# Patient Record
Sex: MALE | Race: WHITE
[De-identification: names, ages, dates, MRNs, and addresses within clinical notes are randomized per-mention and may not be internally consistent; named-entity substitution may affect disease eponyms.]

---

## 2018-02-01 ENCOUNTER — HOSPITAL ENCOUNTER (INPATIENT)
Dept: HOSPITAL 36 - ER | Age: 78
LOS: 6 days | Discharge: HOME | DRG: 885 | End: 2018-02-07
Attending: PSYCHIATRY & NEUROLOGY | Admitting: PSYCHIATRY & NEUROLOGY
Payer: COMMERCIAL

## 2018-02-01 VITALS — DIASTOLIC BLOOD PRESSURE: 75 MMHG | SYSTOLIC BLOOD PRESSURE: 135 MMHG

## 2018-02-01 DIAGNOSIS — F41.9: ICD-10-CM

## 2018-02-01 DIAGNOSIS — I25.2: ICD-10-CM

## 2018-02-01 DIAGNOSIS — F29: Primary | ICD-10-CM

## 2018-02-01 DIAGNOSIS — N40.0: ICD-10-CM

## 2018-02-01 DIAGNOSIS — F32.9: ICD-10-CM

## 2018-02-01 DIAGNOSIS — Z82.49: ICD-10-CM

## 2018-02-01 DIAGNOSIS — Z80.1: ICD-10-CM

## 2018-02-01 DIAGNOSIS — Z88.0: ICD-10-CM

## 2018-02-01 DIAGNOSIS — R45.851: ICD-10-CM

## 2018-02-01 DIAGNOSIS — I25.10: ICD-10-CM

## 2018-02-01 LAB
ALBUMIN SERPL-MCNC: 3.8 GM/DL (ref 4.2–5.5)
ALBUMIN/GLOB SERPL: 1.8 {RATIO} (ref 1–1.8)
ALP SERPL-CCNC: 78 U/L (ref 34–104)
ALT SERPL-CCNC: 25 U/L (ref 7–52)
ANION GAP SERPL CALC-SCNC: 10.3 MMOL/L (ref 7–16)
APPEARANCE UR: CLEAR
AST SERPL-CCNC: 19 U/L (ref 13–39)
BACTERIA #/AREA URNS HPF: (no result) /HPF
BASOPHILS # BLD AUTO: 0 TH/CUMM (ref 0–0.2)
BASOPHILS NFR BLD AUTO: 0.1 % (ref 0–2)
BILIRUB SERPL-MCNC: 0.7 MG/DL (ref 0.3–1)
BILIRUB UR-MCNC: NEGATIVE MG/DL
BUN SERPL-MCNC: 16 MG/DL (ref 7–25)
CALCIUM SERPL-MCNC: 8.9 MG/DL (ref 8.6–10.3)
CHLORIDE SERPL-SCNC: 103 MEQ/L (ref 98–107)
CHOLEST SERPL-MCNC: 109 MG/DL (ref ?–200)
CO2 SERPL-SCNC: 28.6 MEQ/L (ref 21–31)
COLOR UR: YELLOW
CREAT SERPL-MCNC: 1.3 MG/DL (ref 0.7–1.3)
EOSINOPHIL # BLD AUTO: 0.2 TH/CMM (ref 0.1–0.4)
EOSINOPHIL NFR BLD AUTO: 3.3 % (ref 0–5)
EPI CELLS URNS QL MICRO: (no result) /LPF
ERYTHROCYTE [DISTWIDTH] IN BLOOD BY AUTOMATED COUNT: 13.3 % (ref 11.5–20)
GLOBULIN SER-MCNC: 2.1 GM/DL
GLUCOSE SERPL-MCNC: 125 MG/DL (ref 70–105)
GLUCOSE UR STRIP-MCNC: NEGATIVE MG/DL
HCT VFR BLD CALC: 46.8 % (ref 41–60)
HDLC SERPL-MCNC: 45 MG/DL (ref 23–92)
HGB BLD-MCNC: 15.9 GM/DL (ref 12–16)
KETONES UR STRIP-MCNC: NEGATIVE MG/DL
LEUKOCYTE ESTERASE UR-ACNC: NEGATIVE
LYMPHOCYTE AB SER FC-ACNC: 1.3 TH/CMM (ref 1.5–3)
LYMPHOCYTES NFR BLD AUTO: 21.6 % (ref 20–50)
MCH RBC QN AUTO: 32.1 PG (ref 27–31)
MCHC RBC AUTO-ENTMCNC: 33.9 PG (ref 28–36)
MCV RBC AUTO: 94.7 FL (ref 80–99)
MICRO URNS: YES
MONOCYTES # BLD AUTO: 0.7 TH/CMM (ref 0.3–1)
MONOCYTES NFR BLD AUTO: 12.4 % (ref 2–10)
NEUTROPHILS # BLD: 3.6 TH/CMM (ref 1.8–8)
NEUTROPHILS NFR BLD AUTO: 62.6 % (ref 40–80)
NITRITE UR QL STRIP: NEGATIVE
PH UR STRIP: 5.5 [PH] (ref 4.6–8)
PLATELET # BLD: 194 TH/CMM (ref 150–400)
PMV BLD AUTO: 8.2 FL
POTASSIUM SERPL-SCNC: 3.9 MEQ/L (ref 3.5–5.1)
PROT UR STRIP-MCNC: NEGATIVE MG/DL
RBC # BLD AUTO: 4.94 MIL/CMM (ref 3.8–5.8)
RBC # UR STRIP: NEGATIVE /UL
RBC #/AREA URNS HPF: (no result) /HPF (ref 0–5)
SODIUM SERPL-SCNC: 138 MEQ/L (ref 136–145)
SP GR UR STRIP: >= 1.03 (ref 1–1.03)
TRIGL SERPL-MCNC: 189 MG/DL (ref ?–150)
URINALYSIS COMPLETE PNL UR: (no result)
UROBILINOGEN UR STRIP-ACNC: 0.2 E.U./DL (ref 0.2–1)
WBC # BLD AUTO: 5.8 TH/CMM (ref 4.8–10.8)
WBC #/AREA URNS HPF: (no result) /HPF (ref 0–5)

## 2018-02-01 PROCEDURE — G0410 GRP PSYCH PARTIAL HOSP 45-50: HCPCS

## 2018-02-01 PROCEDURE — Z7610: HCPCS

## 2018-02-01 NOTE — ED PHYSICIAN CHART
ED Chief Complaint/HPI





- Patient Information


Date Seen:: 18


Time Seen:: 14:00


Chief Complaint:: suicide ideation


History of Present Illness:: 





Patient's been having suicidal ideation for more than one month.  He states he 

would like to go to sleep and not wake up.  He also thought about jumping off a 

second story of his house.





ED Review of Systems





- Review of Systems


General/Constitutional: No fever, No chills


Skin: No skin lesions


Head: No headache


Eyes: No loss of vision


ENT: No earache


Neck: No neck pain


Cardio Vascular: No chest pain


Pulmonary: No SOB


GI: No nausea, No vomiting, No diarrhea


G/U: No dysuria


Musculoskeletal: No bone or joint pain


Endocrine: No polyuria, No polydipsia


Psychiatric: Prior psych history


Hematopoietic: No bruising


Allergic/Immuno: No urticaria


Neurological: No syncope





ED Past Medical History





- Past Medical History


Past Medical History: CAD, Other (status post myocardial infarction; benign 

prostatic hypertrophy)


Family History: Heart disease, Cancer


Social History: Non Smoker, No Alcohol, No Drug Use


Surgical History: other (left shoulder)


Psychiatricy History: Depression


Medication: Reviewed





Family Medical History





- Family Member


  ** Mother


Age: 70


Ethnicity: Non-


Living Status: 


Hx Family Cancer: Yes (LUNG)





  ** Father


Age: 76


Ethnicity: Non-


Living Status: 


Hx Family Coronary Artery Disease: Yes (MI)





ED Physical Exam





- Physical Examination


General/Constitutional: Well-developed, well-nourished, Alert, No distress


Other Gen/Cons comments:: 





Alert and oriented to the exact date


Head: Atraumatic


Eyes: Lids, conjuctiva normal, PERRL


Skin: Nl inspection, No rash, No skin lesions, No ecchymosis


ENMT: External ears, nose nl, Nasal exam nl, Oropharynx nl


Other ENMT comments:: 


3 out of 4 periodontal disease


Neck: No nuchal rigidity


Respiratory: Nl effort/Exclusion, Clear to Auscultation, No Wheeze/Rhonchi/Rales


Cardio Vascular: RRR


GI: No tenderness/rebounding/guarding


: No CVA tenderness


Other Extremities comments:: 





2 out of 4 pretibial pitting edema


Neuro/Psych: Alert/oriented


Misc: Normal back





ED Labs/Radiology/EKG Results





- Lab Results


Results: 





 Laboratory Results - last 24 hr











  18





  14:27


 


Urine Source  CLEAN C


 


Urine Color  YELLOW


 


Urine Clarity  CLEAR


 


Urine pH  5.5


 


Ur Specific Gravity  >= 1.030


 


Urine Protein  NEGATIVE


 


Urine Glucose (UA)  NEGATIVE


 


Urine Ketones  NEGATIVE


 


Urine Blood  NEGATIVE


 


Urine Nitrate  NEGATIVE


 


Urine Bilirubin  NEGATIVE


 


Urine Urobilinogen  0.2


 


Ur Leukocyte Esterase  NEGATIVE


 


Urine RBC  NONE SEEN


 


Urine WBC  NONE SEEN


 


Ur Epithelial Cells  NONE SEEN


 


Urine Bacteria  NONE SEEN














- Radiology Results


Results: 





 Laboratory Results - last 24 hr











  18





  14:27 14:27 14:27


 


WBC   5.8 


 


RBC   4.94 


 


Hgb   15.9 


 


Hct   46.8 


 


MCV   94.7 


 


MCH   32.1 H 


 


MCHC Differential   33.9 


 


RDW   13.3 


 


Plt Count   194 


 


MPV   8.2 


 


Neutrophils %   62.6 


 


Lymphocytes %   21.6 


 


Monocytes %   12.4 H 


 


Eosinophils %   3.3 


 


Basophils %   0.1 


 


Sodium    138


 


Potassium    3.9


 


Chloride    103


 


Carbon Dioxide    28.6


 


Anion Gap    10.3


 


BUN    16


 


Creatinine    1.3


 


Est GFR ( Amer)    TNP


 


Est GFR (Non-Af Amer)    TNP


 


BUN/Creatinine Ratio    12.3


 


Glucose    125 H


 


Calcium    8.9


 


Total Bilirubin    0.7


 


AST    19


 


ALT    25


 


Alkaline Phosphatase    78


 


Total Protein    5.9 L


 


Albumin    3.8 L


 


Globulin    2.1


 


Albumin/Globulin Ratio    1.8


 


Triglycerides    189 H


 


Cholesterol    109


 


LDL Cholesterol Direct    49 L


 


HDL Cholesterol    45


 


Urine Source  CLEAN C  


 


Urine Color  YELLOW  


 


Urine Clarity  CLEAR  


 


Urine pH  5.5  


 


Ur Specific Gravity  >= 1.030  


 


Urine Protein  NEGATIVE  


 


Urine Glucose (UA)  NEGATIVE  


 


Urine Ketones  NEGATIVE  


 


Urine Blood  NEGATIVE  


 


Urine Nitrate  NEGATIVE  


 


Urine Bilirubin  NEGATIVE  


 


Urine Urobilinogen  0.2  


 


Ur Leukocyte Esterase  NEGATIVE  


 


Urine RBC  NONE SEEN  


 


Urine WBC  NONE SEEN  


 


Ur Epithelial Cells  NONE SEEN  


 


Urine Bacteria  NONE SEEN  














- EKG Interpretations


Rate & Rhythm: normal sinus rhythm with a rate of 91


Axis: normal axis


Comments:: 


Unifocal PVCs; right bundle-branch block





ED Septic Shock





- .


Is Septic Shock (SBP<90, OR Lactate>4 mmol\L) present?: No





ED Reassessment (Disposition)





- Reassessment


Reassessment Condition:: Unchanged





- Diagnosis


Diagnosis:: 





Suicidal ideation; depression





- Patient Disposition


Admitted to:: [facility]


Admitting Medical Physician:: Lang Lee


Admitting Psych Physician:: Eva Del Rio


Condition at Disposition:: Stable, Unchanged

## 2018-02-02 NOTE — HISTORY & PHYSICAL
ADMIT DATE:  02/02/2018



CHIEF COMPLAINT:  Suicidal ideation.



HISTORY OF PRESENT ILLNESS:  This is a 77-year-old male who has been having

suicidal ideation for about a month.  The patient is now admitted to the

Geropsych Unit.



PAST MEDICAL HISTORY:  CAD, status post MI, BPH.



FAMILY HISTORY:  Noncontributory.



SOCIAL HISTORY:  The patient denies any alcohol or illicit drug usage and

tobacco smoking.



SURGICAL HISTORY:  Left shoulder.



MEDICATIONS:  Please see medication reconciliation.



REVIEW OF SYSTEMS:

GENERAL:  Denies any fevers and chills.

CARDIOVASCULAR:  Denies chest pain.

RESPIRATORY:  Denies shortness of breath.

GASTROINTESTINAL:  Denies nausea, vomiting, abdominal pain.

GENITOURINARY:  Denies increased frequency or dysuria.

NEUROLOGIC:  No headaches, seizures or syncope.

PSYCHIATRIC:  As stated above.

EXTREMITIES:  No leg pain or swelling.



PHYSICAL EXAMINATION:

GENERAL:  The patient is well-developed, well-nourished, no acute distress.

VITAL SIGNS:  Temperature at 97.8, heart rate ___, blood pressure 122/74,

respirations 20, O2 sat 97%.

HEENT:  Head; normocephalic, atraumatic.

NECK:  Supple.  No mass.

LUNGS:  Clear bilaterally.

HEART:  Regular rhythm. 

ABDOMEN:  Soft, nontender.



LABORATORY DATA:  WBC 5.8, H and H 15.9 and 46.8, platelet of 194.  Sodium 138,

potassium 3.9, chloride 103, BUN 16, creatinine 1.3.



ASSESSMENT:

1.  Suicidal ideation.

2.  History of coronary artery disease.

3.  History of MI.

4.  BPH.



PLAN:  We will continue the patient's home medications.  Fall precautions will

be initiated.  We will continue to follow this patient.





DD: 02/02/2018 15:22

DT: 02/02/2018 19:06

JOB# [id number]  [id number]

## 2018-02-02 NOTE — INTERNAL MEDICINE PROG NOTE
Internal Medicine Subjective





- Subjective


Service Date: 02/02/18 ([id number])





Internal Medicine Objective





- Results


Result Diagrams: 


 02/01/18 14:27





 02/01/18 14:27


Recent Labs: 


 Laboratory Last Values











WBC  5.8 Th/cmm (4.8-10.8)   02/01/18  14:27    


 


RBC  4.94 Mil/cmm (3.80-5.80)   02/01/18  14:27    


 


Hgb  15.9 gm/dL (12-16)   02/01/18  14:27    


 


Hct  46.8 % (41.0-60)   02/01/18  14:27    


 


MCV  94.7 fl (80-99)   02/01/18  14:27    


 


MCH  32.1 pg (27.0-31.0)  H  02/01/18  14:27    


 


MCHC Differential  33.9 pg (28.0-36.0)   02/01/18  14:27    


 


RDW  13.3 % (11.5-20.0)   02/01/18  14:27    


 


Plt Count  194 Th/cmm (150-400)   02/01/18  14:27    


 


MPV  8.2 fl  02/01/18  14:27    


 


Neutrophils %  62.6 % (40.0-80.0)   02/01/18  14:27    


 


Lymphocytes %  21.6 % (20.0-50.0)   02/01/18  14:27    


 


Monocytes %  12.4 % (2.0-10.0)  H  02/01/18  14:27    


 


Eosinophils %  3.3 % (0.0-5.0)   02/01/18  14:27    


 


Basophils %  0.1 % (0.0-2.0)   02/01/18  14:27    


 


Sodium  138 mEq/L (136-145)   02/01/18  14:27    


 


Potassium  3.9 mEq/L (3.5-5.1)   02/01/18  14:27    


 


Chloride  103 mEq/L ()   02/01/18  14:27    


 


Carbon Dioxide  28.6 mEq/L (21.0-31.0)   02/01/18  14:27    


 


Anion Gap  10.3  (7.0-16.0)   02/01/18  14:27    


 


BUN  16 mg/dL (7-25)   02/01/18  14:27    


 


Creatinine  1.3 mg/dL (0.7-1.3)   02/01/18  14:27    


 


Est GFR ( Amer)  TNP   02/01/18  14:27    


 


Est GFR (Non-Af Amer)  TNP   02/01/18  14:27    


 


BUN/Creatinine Ratio  12.3   02/01/18  14:27    


 


Glucose  125 mg/dL ()  H  02/01/18  14:27    


 


Calcium  8.9 mg/dL (8.6-10.3)   02/01/18  14:27    


 


Total Bilirubin  0.7 mg/dL (0.3-1.0)   02/01/18  14:27    


 


AST  19 U/L (13-39)   02/01/18  14:27    


 


ALT  25 U/L (7-52)   02/01/18  14:27    


 


Alkaline Phosphatase  78 U/L ()   02/01/18  14:27    


 


Total Protein  5.9 gm/dL (6.0-8.3)  L  02/01/18  14:27    


 


Albumin  3.8 gm/dL (4.2-5.5)  L  02/01/18  14:27    


 


Globulin  2.1 gm/dL  02/01/18  14:27    


 


Albumin/Globulin Ratio  1.8  (1.0-1.8)   02/01/18  14:27    


 


Triglycerides  189 mg/dL (<150)  H  02/01/18  14:27    


 


Cholesterol  109 mg/dL (<200)   02/01/18  14:27    


 


LDL Cholesterol Direct  49 mg/dL ()  L  02/01/18  14:27    


 


HDL Cholesterol  45 mg/dL (23-92)   02/01/18  14:27    


 


TSH  2.55 uIU/ml (0.34-5.60)   02/01/18  14:27    


 


Urine Source  CLEAN C   02/01/18  14:27    


 


Urine Color  YELLOW   02/01/18  14:27    


 


Urine Clarity  CLEAR  (CLEAR)   02/01/18  14:27    


 


Urine pH  5.5  (4.6 - 8.0)   02/01/18  14:27    


 


Ur Specific Gravity  >= 1.030  (1.005-1.030)   02/01/18  14:27    


 


Urine Protein  NEGATIVE mg/dL (NEGATIVE)   02/01/18  14:27    


 


Urine Glucose (UA)  NEGATIVE mg/dL (NEGATIVE)   02/01/18  14:27    


 


Urine Ketones  NEGATIVE mg/dL (NEGATIVE)   02/01/18  14:27    


 


Urine Blood  NEGATIVE  (NEGATIVE)   02/01/18  14:27    


 


Urine Nitrate  NEGATIVE  (NEGATIVE)   02/01/18  14:27    


 


Urine Bilirubin  NEGATIVE  (NEGATIVE)   02/01/18  14:27    


 


Urine Urobilinogen  0.2 E.U./dL (0.2 - 1.0)   02/01/18  14:27    


 


Ur Leukocyte Esterase  NEGATIVE  (NEGATIVE)   02/01/18  14:27    


 


Urine RBC  NONE SEEN /hpf (0-5)   02/01/18  14:27    


 


Urine WBC  NONE SEEN /hpf (0-5)   02/01/18  14:27    


 


Ur Epithelial Cells  NONE SEEN /lpf (FEW)   02/01/18  14:27    


 


Urine Bacteria  NONE SEEN /hpf (NONE SEEN)   02/01/18  14:27    


 


RPR  NONREACTIVE  (NONREACTIVE)   02/01/18  14:27    














- Physical Exam


Vitals and I&O: 


 Vital Signs











Temp  97.8 F   02/01/18 20:00


 


Pulse  102   02/01/18 20:00


 


Resp  20   02/01/18 20:00


 


BP  122/74   02/01/18 20:00


 


Pulse Ox  97   02/01/18 20:00











Active Medications: 


Current Medications





Acetaminophen (Tylenol)  650 mg PO Q4HR PRN


   PRN Reason: Mild Pain / Temp above 100


   Stop: 04/02/18 18:44


Al Hydrox/Mg Hydrox/Simethicone (Maalox)  30 ml PO Q4HR PRN


   PRN Reason: GI DISTRESS


   Stop: 04/02/18 18:44


Aspirin (Aspirin Chewable)  81 mg PO DAILY [facility]


   Stop: 04/04/18 08:59


Carvedilol (Coreg)  6.25 mg PO BID [facility]


   Stop: 04/03/18 16:59


Clopidogrel Bisulfate (Plavix)  75 mg PO DAILY [facility]


   Stop: 04/04/18 08:59


Finasteride (Proscar)  5 mg PO DAILY [facility]


   PRN Reason: Protocol


   Stop: 04/04/18 08:59


Lorazepam (Ativan)  0.5 mg PO Q4HR PRN; Protocol


   PRN Reason: Anxiety


   Stop: 03/03/18 18:44


Lorazepam (Ativan)  0.5 mg PO DAILY [facility]


   PRN Reason: Protocol


   Stop: 04/04/18 08:59


Magnesium Hydroxide (Milk Of Magnesia)  30 ml PO HS PRN


   PRN Reason: Constipation


Multivitamins/Vitamin C (Theragran)  1 tab PO DAILY [facility]


   Stop: 04/03/18 08:59


   Last Admin: 02/02/18 09:06 Dose:  1 tab


Naproxen (Naprosyn)  250 mg PO Q12H PRN


   PRN Reason: Pain (Mild)


   Stop: 04/03/18 13:19


Quetiapine Fumarate (Seroquel)  25 mg PO BID [facility]


   PRN Reason: Protocol


   Stop: 04/03/18 16:59


Simvastatin (Zocor)  10 mg PO QPM [facility]


   PRN Reason: Protocol


   Stop: 04/03/18 16:59


Tamsulosin HCl (Flomax)  0.4 mg PO DAILY [facility]


   Stop: 04/04/18 08:59


Temazepam (Restoril)  15 mg PO HS PRN; Protocol


   PRN Reason: Insomnia


   Stop: 04/03/18 13:19


Vitamin E (Vitamin E)  1,000 iu PO DAILY [facility]


   Stop: 04/04/18 08:59


Zolpidem Tartrate (Ambien)  5 mg PO HS PRN


   PRN Reason: Insomnia


   Stop: 04/02/18 18:44


   Last Admin: 02/01/18 22:19 Dose:  5 mg

## 2018-02-03 RX ADMIN — ASPIRIN 81 MG SCH MG: 81 TABLET ORAL at 09:23

## 2018-02-03 RX ADMIN — Medication SCH IU: at 09:23

## 2018-02-03 NOTE — PSYCHOSOCIAL EVALUATION
DATE OF SERVICE:  02/01/2018



IDENTIFYING DATA:  The patient is a 77-year-old  male living with his

family.  Information obtained by directly interviewing the patient as well as

reviewing the admission papers and they are reliable.



JUSTIFICATION FOR HOSPITALIZATION:  The patient is admitted here on a voluntary

basis in view of his acute psychosis.



CHIEF COMPLAINT:  "I don't know what is happening in here and there is not much

for me to do in here."



HISTORY OF PRESENT ILLNESS:  This is the first psychiatric hospitalization to

[facility] for this patient who is reported to have been acting

erratic and has been agitated and has been throwing stuff.  The patient has been

noted to be on Seroquel 25 mg twice a day along with Restoril and Ativan.  The

patient's sleep and appetite prior to the hospitalization are reported to be

poor.  When I am asking the details about the information, the patient is

getting easily irritable and is stating that everything is written I need to

look into the paperwork.



PAST PSYCHIATRIC HISTORY:  Details are not known.



MEDICAL HISTORY AND PHYSICAL EXAMINATION:  Requested to be done by Dr. Lee.



SUBSTANCE ABUSE HISTORY:  None.



PHYSICAL OR SEXUAL ABUSE HISTORY:  None.



LEGAL PROBLEMS:  None at this time.



STRENGTH AND ASSETS:  The patient is motivated.



MENTAL STATUS EXAMINATION:  The patient is a 77-year-old, looking his stated

age, superficially cooperative.  Eye contact is poor.  Mood is noted to be

irritable.  Affect is constricted.  The patient is dysphoric.  The patient has

been having difficult time to cope with the stress.  The patient is not able to

contract for safety.  The patient is reported to have been getting easily

agitated and has been trying to be very disruptive.  The patient has been

denying any command hallucination, but the patient is noted to be very paranoid

and has been stating that the noise has been bothering him and he does not

belong in here.  The patient's attention span and concentration are noted to be

fair.  Short and long-term are noted to be fair at this time.



DIAGNOSES AT THE TIME OF ADMISSION:

AXIS I:  Psychotic disorder, not otherwise specified.

AXIS II:  None.

AXIS III:  As per Dr. Lee.



IMMEDIATE TREATMENT PLAN:  The patient is going to be observed on inpatient

unit, provided with supportive psychotherapy.  The patient is going to be

closely monitored.  Once stabilized, the patient is going to be discharged to

[facility] to be followed up on an outpatient basis.





DD: 02/02/2018 18:59

DT: 02/03/2018 01:30

JOB# [id number]  [id number]

## 2018-02-03 NOTE — PROGRESS NOTES
DATE:  02/03/2018



SUBJECTIVE:  Staff was spoken to.  The patient is interviewed.  Mood is noted to

be irritable.  Affect is constricted.  Insight and judgment are still impaired. 

The patient is reported to have been having problems in coping with the stress

and wanted to end his life.  The patient has no insight into his illness.



ASSESSMENT:  The patient is still not able to contract for his safety.



PLAN:  The patient is not ready to be discharged to a lower level of care.





DD: 02/03/2018 14:22

DT: 02/03/2018 18:14

JOB# [id number]  [id number]

## 2018-02-04 RX ADMIN — Medication SCH IU: at 09:34

## 2018-02-04 RX ADMIN — ASPIRIN 81 MG SCH MG: 81 TABLET ORAL at 09:31

## 2018-02-04 NOTE — PROGRESS NOTES
DATE:  02/04/2018



SUBJECTIVE:  Staff was spoken to.  Patient is interviewed.  Mood is weak and

depressed.  Affect is constricted.  The patient is isolated and withdrawn.  The

patient is stating that his daughter and his son came by yesterday had a good

visit.  The patient is stating that he likes to sing and then he states that he

has been participating in the groups.



ASSESSMENT:  The patient continues to be still depressed.



PLAN:  To continue the patient with the supportive therapy, encouraged the

patient to verbalize the concerns rather than to act out.





DD: 02/04/2018 14:02

DT: 02/04/2018 23:15

JOB# [id number]  [id number]

## 2018-02-04 NOTE — INTERNAL MEDICINE PROG NOTE
Internal Medicine Subjective





- Subjective


Service Date: 02/04/18


Patient is:: awake


Per staff patient has:: no adverse event, tolerating meds





Internal Medicine Objective





- Results


Result Diagrams: 


 02/01/18 14:27





 02/01/18 14:27


Recent Labs: 


 Laboratory Last Values











WBC  5.8 Th/cmm (4.8-10.8)   02/01/18  14:27    


 


RBC  4.94 Mil/cmm (3.80-5.80)   02/01/18  14:27    


 


Hgb  15.9 gm/dL (12-16)   02/01/18  14:27    


 


Hct  46.8 % (41.0-60)   02/01/18  14:27    


 


MCV  94.7 fl (80-99)   02/01/18  14:27    


 


MCH  32.1 pg (27.0-31.0)  H  02/01/18  14:27    


 


MCHC Differential  33.9 pg (28.0-36.0)   02/01/18  14:27    


 


RDW  13.3 % (11.5-20.0)   02/01/18  14:27    


 


Plt Count  194 Th/cmm (150-400)   02/01/18  14:27    


 


MPV  8.2 fl  02/01/18  14:27    


 


Neutrophils %  62.6 % (40.0-80.0)   02/01/18  14:27    


 


Lymphocytes %  21.6 % (20.0-50.0)   02/01/18  14:27    


 


Monocytes %  12.4 % (2.0-10.0)  H  02/01/18  14:27    


 


Eosinophils %  3.3 % (0.0-5.0)   02/01/18  14:27    


 


Basophils %  0.1 % (0.0-2.0)   02/01/18  14:27    


 


Sodium  138 mEq/L (136-145)   02/01/18  14:27    


 


Potassium  3.9 mEq/L (3.5-5.1)   02/01/18  14:27    


 


Chloride  103 mEq/L ()   02/01/18  14:27    


 


Carbon Dioxide  28.6 mEq/L (21.0-31.0)   02/01/18  14:27    


 


Anion Gap  10.3  (7.0-16.0)   02/01/18  14:27    


 


BUN  16 mg/dL (7-25)   02/01/18  14:27    


 


Creatinine  1.3 mg/dL (0.7-1.3)   02/01/18  14:27    


 


Est GFR ( Amer)  TNP   02/01/18  14:27    


 


Est GFR (Non-Af Amer)  TNP   02/01/18  14:27    


 


BUN/Creatinine Ratio  12.3   02/01/18  14:27    


 


Glucose  125 mg/dL ()  H  02/01/18  14:27    


 


Calcium  8.9 mg/dL (8.6-10.3)   02/01/18  14:27    


 


Total Bilirubin  0.7 mg/dL (0.3-1.0)   02/01/18  14:27    


 


AST  19 U/L (13-39)   02/01/18  14:27    


 


ALT  25 U/L (7-52)   02/01/18  14:27    


 


Alkaline Phosphatase  78 U/L ()   02/01/18  14:27    


 


Total Protein  5.9 gm/dL (6.0-8.3)  L  02/01/18  14:27    


 


Albumin  3.8 gm/dL (4.2-5.5)  L  02/01/18  14:27    


 


Globulin  2.1 gm/dL  02/01/18  14:27    


 


Albumin/Globulin Ratio  1.8  (1.0-1.8)   02/01/18  14:27    


 


Triglycerides  189 mg/dL (<150)  H  02/01/18  14:27    


 


Cholesterol  109 mg/dL (<200)   02/01/18  14:27    


 


LDL Cholesterol Direct  49 mg/dL ()  L  02/01/18  14:27    


 


HDL Cholesterol  45 mg/dL (23-92)   02/01/18  14:27    


 


TSH  2.55 uIU/ml (0.34-5.60)   02/01/18  14:27    


 


Urine Source  CLEAN C   02/01/18  14:27    


 


Urine Color  YELLOW   02/01/18  14:27    


 


Urine Clarity  CLEAR  (CLEAR)   02/01/18  14:27    


 


Urine pH  5.5  (4.6 - 8.0)   02/01/18  14:27    


 


Ur Specific Gravity  >= 1.030  (1.005-1.030)   02/01/18  14:27    


 


Urine Protein  NEGATIVE mg/dL (NEGATIVE)   02/01/18  14:27    


 


Urine Glucose (UA)  NEGATIVE mg/dL (NEGATIVE)   02/01/18  14:27    


 


Urine Ketones  NEGATIVE mg/dL (NEGATIVE)   02/01/18  14:27    


 


Urine Blood  NEGATIVE  (NEGATIVE)   02/01/18  14:27    


 


Urine Nitrate  NEGATIVE  (NEGATIVE)   02/01/18  14:27    


 


Urine Bilirubin  NEGATIVE  (NEGATIVE)   02/01/18  14:27    


 


Urine Urobilinogen  0.2 E.U./dL (0.2 - 1.0)   02/01/18  14:27    


 


Ur Leukocyte Esterase  NEGATIVE  (NEGATIVE)   02/01/18  14:27    


 


Urine RBC  NONE SEEN /hpf (0-5)   02/01/18  14:27    


 


Urine WBC  NONE SEEN /hpf (0-5)   02/01/18  14:27    


 


Ur Epithelial Cells  NONE SEEN /lpf (FEW)   02/01/18  14:27    


 


Urine Bacteria  NONE SEEN /hpf (NONE SEEN)   02/01/18  14:27    


 


RPR  NONREACTIVE  (NONREACTIVE)   02/01/18  14:27    














- Physical Exam


Vitals and I&O: 


 Vital Signs











Temp  97 F   02/03/18 18:17


 


Pulse  70   02/04/18 09:35


 


Resp  18   02/03/18 18:17


 


BP  140/80   02/04/18 09:35


 


Pulse Ox  95   02/03/18 18:17








 Intake & Output











 02/03/18 02/04/18 02/04/18





 18:59 06:59 18:59


 


Intake Total   120


 


Balance   120


 


Intake:   


 


  Oral   120


 


Other:   


 


  # Voids   2











Active Medications: 


Current Medications





Acetaminophen (Tylenol)  650 mg PO Q4HR PRN


   PRN Reason: Mild Pain / Temp above 100


   Stop: 04/02/18 18:44


Al Hydrox/Mg Hydrox/Simethicone (Maalox)  30 ml PO Q4HR PRN


   PRN Reason: GI DISTRESS


   Stop: 04/02/18 18:44


Aspirin (Aspirin Chewable)  81 mg PO DAILY [facility]


   Stop: 04/04/18 08:59


   Last Admin: 02/04/18 09:31 Dose:  81 mg


Carvedilol (Coreg)  6.25 mg PO BID [facility]


   Stop: 04/03/18 16:59


   Last Admin: 02/04/18 09:35 Dose:  6.25 mg


Clopidogrel Bisulfate (Plavix)  75 mg PO DAILY [facility]


   Stop: 04/04/18 08:59


   Last Admin: 02/04/18 09:34 Dose:  75 mg


Finasteride (Proscar)  5 mg PO DAILY [facility]


   PRN Reason: Protocol


   Stop: 04/04/18 08:59


   Last Admin: 02/04/18 09:34 Dose:  5 mg


Lorazepam (Ativan)  0.5 mg PO Q4HR PRN; Protocol


   PRN Reason: Anxiety


   Stop: 03/03/18 18:44


   Last Admin: 02/03/18 21:23 Dose:  0.5 mg


Magnesium Hydroxide (Milk Of Magnesia)  30 ml PO HS PRN


   PRN Reason: Constipation


Multivitamins/Vitamin C (Theragran)  1 tab PO DAILY [facility]


   Stop: 04/03/18 08:59


   Last Admin: 02/04/18 09:34 Dose:  1 tab


Naproxen (Naprosyn)  250 mg PO Q12H PRN


   PRN Reason: Pain (Mild)


   Stop: 04/03/18 13:19


Quetiapine Fumarate (Seroquel)  25 mg PO BID [facility]


   PRN Reason: Protocol


   Stop: 04/03/18 16:59


   Last Admin: 02/04/18 09:34 Dose:  25 mg


Simvastatin (Zocor)  10 mg PO QPM [facility]


   PRN Reason: Protocol


   Stop: 04/03/18 16:59


   Last Admin: 02/03/18 16:53 Dose:  10 mg


Tamsulosin HCl (Flomax)  0.4 mg PO DAILY [facility]


   Stop: 04/04/18 08:59


   Last Admin: 02/04/18 09:34 Dose:  0.4 mg


Temazepam (Restoril)  15 mg PO HS PRN; Protocol


   PRN Reason: Insomnia


   Stop: 04/03/18 13:19


   Last Admin: 02/03/18 21:24 Dose:  15 mg


Vitamin E (Vitamin E)  1,000 iu PO DAILY [facility]


   Stop: 04/04/18 08:59


   Last Admin: 02/04/18 09:34 Dose:  1,000 iu








General: alert


HEENT: NC/AT, PERRLA


Neck: Supple


Lungs: CTAB


Cardiovascular: RRR, Normal S1, Normal S2


Abdomen: soft, non-tender, non-distended


Neurological: alert





Internal Medicine Assmt/Plan





- Assessment


Assessment: 





suicidal ideation


hx cad


hx mi


bph





- Plan


Plan: 





fall precautions


continue current plan of care

## 2018-02-05 RX ADMIN — Medication SCH IU: at 10:30

## 2018-02-05 RX ADMIN — ASPIRIN 81 MG SCH MG: 81 TABLET ORAL at 10:29

## 2018-02-05 NOTE — INTERNAL MEDICINE PROG NOTE
Internal Medicine Subjective





- Subjective


Service Date: 02/05/18


Patient is:: awake


Per staff patient has:: no adverse event, tolerating meds





Internal Medicine Objective





- Results


Result Diagrams: 


 02/01/18 14:27





 02/01/18 14:27


Recent Labs: 


 Laboratory Last Values











WBC  5.8 Th/cmm (4.8-10.8)   02/01/18  14:27    


 


RBC  4.94 Mil/cmm (3.80-5.80)   02/01/18  14:27    


 


Hgb  15.9 gm/dL (12-16)   02/01/18  14:27    


 


Hct  46.8 % (41.0-60)   02/01/18  14:27    


 


MCV  94.7 fl (80-99)   02/01/18  14:27    


 


MCH  32.1 pg (27.0-31.0)  H  02/01/18  14:27    


 


MCHC Differential  33.9 pg (28.0-36.0)   02/01/18  14:27    


 


RDW  13.3 % (11.5-20.0)   02/01/18  14:27    


 


Plt Count  194 Th/cmm (150-400)   02/01/18  14:27    


 


MPV  8.2 fl  02/01/18  14:27    


 


Neutrophils %  62.6 % (40.0-80.0)   02/01/18  14:27    


 


Lymphocytes %  21.6 % (20.0-50.0)   02/01/18  14:27    


 


Monocytes %  12.4 % (2.0-10.0)  H  02/01/18  14:27    


 


Eosinophils %  3.3 % (0.0-5.0)   02/01/18  14:27    


 


Basophils %  0.1 % (0.0-2.0)   02/01/18  14:27    


 


Sodium  138 mEq/L (136-145)   02/01/18  14:27    


 


Potassium  3.9 mEq/L (3.5-5.1)   02/01/18  14:27    


 


Chloride  103 mEq/L ()   02/01/18  14:27    


 


Carbon Dioxide  28.6 mEq/L (21.0-31.0)   02/01/18  14:27    


 


Anion Gap  10.3  (7.0-16.0)   02/01/18  14:27    


 


BUN  16 mg/dL (7-25)   02/01/18  14:27    


 


Creatinine  1.3 mg/dL (0.7-1.3)   02/01/18  14:27    


 


Est GFR ( Amer)  TNP   02/01/18  14:27    


 


Est GFR (Non-Af Amer)  TNP   02/01/18  14:27    


 


BUN/Creatinine Ratio  12.3   02/01/18  14:27    


 


Glucose  125 mg/dL ()  H  02/01/18  14:27    


 


Calcium  8.9 mg/dL (8.6-10.3)   02/01/18  14:27    


 


Total Bilirubin  0.7 mg/dL (0.3-1.0)   02/01/18  14:27    


 


AST  19 U/L (13-39)   02/01/18  14:27    


 


ALT  25 U/L (7-52)   02/01/18  14:27    


 


Alkaline Phosphatase  78 U/L ()   02/01/18  14:27    


 


Total Protein  5.9 gm/dL (6.0-8.3)  L  02/01/18  14:27    


 


Albumin  3.8 gm/dL (4.2-5.5)  L  02/01/18  14:27    


 


Globulin  2.1 gm/dL  02/01/18  14:27    


 


Albumin/Globulin Ratio  1.8  (1.0-1.8)   02/01/18  14:27    


 


Triglycerides  189 mg/dL (<150)  H  02/01/18  14:27    


 


Cholesterol  109 mg/dL (<200)   02/01/18  14:27    


 


LDL Cholesterol Direct  49 mg/dL ()  L  02/01/18  14:27    


 


HDL Cholesterol  45 mg/dL (23-92)   02/01/18  14:27    


 


TSH  2.55 uIU/ml (0.34-5.60)   02/01/18  14:27    


 


Urine Source  CLEAN C   02/01/18  14:27    


 


Urine Color  YELLOW   02/01/18  14:27    


 


Urine Clarity  CLEAR  (CLEAR)   02/01/18  14:27    


 


Urine pH  5.5  (4.6 - 8.0)   02/01/18  14:27    


 


Ur Specific Gravity  >= 1.030  (1.005-1.030)   02/01/18  14:27    


 


Urine Protein  NEGATIVE mg/dL (NEGATIVE)   02/01/18  14:27    


 


Urine Glucose (UA)  NEGATIVE mg/dL (NEGATIVE)   02/01/18  14:27    


 


Urine Ketones  NEGATIVE mg/dL (NEGATIVE)   02/01/18  14:27    


 


Urine Blood  NEGATIVE  (NEGATIVE)   02/01/18  14:27    


 


Urine Nitrate  NEGATIVE  (NEGATIVE)   02/01/18  14:27    


 


Urine Bilirubin  NEGATIVE  (NEGATIVE)   02/01/18  14:27    


 


Urine Urobilinogen  0.2 E.U./dL (0.2 - 1.0)   02/01/18  14:27    


 


Ur Leukocyte Esterase  NEGATIVE  (NEGATIVE)   02/01/18  14:27    


 


Urine RBC  NONE SEEN /hpf (0-5)   02/01/18  14:27    


 


Urine WBC  NONE SEEN /hpf (0-5)   02/01/18  14:27    


 


Ur Epithelial Cells  NONE SEEN /lpf (FEW)   02/01/18  14:27    


 


Urine Bacteria  NONE SEEN /hpf (NONE SEEN)   02/01/18  14:27    


 


RPR  NONREACTIVE  (NONREACTIVE)   02/01/18  14:27    














- Physical Exam


Vitals and I&O: 


 Vital Signs











Temp  98.7 F   02/04/18 20:00


 


Pulse  77   02/05/18 10:29


 


Resp  20   02/04/18 20:00


 


BP  118/66   02/05/18 10:29


 


Pulse Ox  95   02/04/18 20:00








 Intake & Output











 02/04/18 02/05/18 02/05/18





 18:59 06:59 18:59


 


Intake Total 120  


 


Balance 120  


 


Intake:   


 


  Oral 120  


 


Other:   


 


  # Voids 2  











Active Medications: 


Current Medications





Acetaminophen (Tylenol)  650 mg PO Q4HR PRN


   PRN Reason: Mild Pain / Temp above 100


   Stop: 04/02/18 18:44


Al Hydrox/Mg Hydrox/Simethicone (Maalox)  30 ml PO Q4HR PRN


   PRN Reason: GI DISTRESS


   Stop: 04/02/18 18:44


Aspirin (Aspirin Chewable)  81 mg PO DAILY [facility]


   Stop: 04/04/18 08:59


   Last Admin: 02/05/18 10:29 Dose:  81 mg


Carvedilol (Coreg)  6.25 mg PO BID [facility]


   Stop: 04/03/18 16:59


   Last Admin: 02/05/18 10:29 Dose:  6.25 mg


Clopidogrel Bisulfate (Plavix)  75 mg PO DAILY [facility]


   Stop: 04/04/18 08:59


   Last Admin: 02/05/18 10:30 Dose:  75 mg


Finasteride (Proscar)  5 mg PO DAILY [facility]


   PRN Reason: Protocol


   Stop: 04/04/18 08:59


   Last Admin: 02/05/18 10:30 Dose:  5 mg


Lorazepam (Ativan)  0.5 mg PO Q4HR PRN; Protocol


   PRN Reason: Anxiety


   Stop: 03/03/18 18:44


   Last Admin: 02/05/18 00:36 Dose:  0.5 mg


Magnesium Hydroxide (Milk Of Magnesia)  30 ml PO HS PRN


   PRN Reason: Constipation


Multivitamins/Vitamin C (Theragran)  1 tab PO DAILY [facility]


   Stop: 04/03/18 08:59


   Last Admin: 02/05/18 10:30 Dose:  1 tab


Naproxen (Naprosyn)  250 mg PO Q12H PRN


   PRN Reason: Pain (Mild)


   Stop: 04/03/18 13:19


Quetiapine Fumarate (Seroquel)  25 mg PO BID [facility]


   PRN Reason: Protocol


   Stop: 04/03/18 16:59


   Last Admin: 02/05/18 10:30 Dose:  25 mg


Simvastatin (Zocor)  10 mg PO QPM [facility]


   PRN Reason: Protocol


   Stop: 04/03/18 16:59


   Last Admin: 02/04/18 18:10 Dose:  10 mg


Tamsulosin HCl (Flomax)  0.4 mg PO DAILY [facility]


   Stop: 04/04/18 08:59


   Last Admin: 02/05/18 10:30 Dose:  0.4 mg


Temazepam (Restoril)  15 mg PO HS PRN; Protocol


   PRN Reason: Insomnia


   Stop: 04/03/18 13:19


   Last Admin: 02/04/18 21:52 Dose:  15 mg


Vitamin E (Vitamin E)  1,000 iu PO DAILY [facility]


   Stop: 04/04/18 08:59


   Last Admin: 02/05/18 10:30 Dose:  1,000 iu








General: alert


HEENT: NC/AT, PERRLA


Neck: Supple


Lungs: CTAB


Cardiovascular: RRR, Normal S1, Normal S2


Abdomen: soft, non-tender, non-distended


Neurological: alert





Internal Medicine Assmt/Plan





- Assessment


Assessment: 





suicidal ideation


hx cad


hx mi


bph





- Plan


Plan: 





fall precautions


continue current plan of care

## 2018-02-06 RX ADMIN — ESCITALOPRAM SCH MG: 5 TABLET, FILM COATED ORAL at 09:43

## 2018-02-06 RX ADMIN — Medication SCH IU: at 09:43

## 2018-02-06 RX ADMIN — ASPIRIN 81 MG SCH MG: 81 TABLET ORAL at 09:42

## 2018-02-06 NOTE — PROGRESS NOTES
DATE:  02/05/2018



SUBJECTIVE:  Staff was spoken to.  The patient is interviewed.  Mood is noted to

be less irritable.  Insight and judgment are noted to be improving.  Impulse

control seems to be fair.  The patient is stating that he is trying to

participate in the groups, but his major concern seems to be not able to sleep

well.  The patient is stating that he is getting tired, but does not have enough

sleep at nighttime.  No side effects to the medications are noted.  The

patient's Seroquel is going to be changed to 50 mg at bedtime and the patient is

going to be closely monitored and the patient's coping skills are noted to be

improving.  The patient's family has been coming and then visiting the patient. 

The patient is being managed with the 50 mg of Seroquel at nighttime.  Since the

depression is a concern, the patient is going to be started on a low dose of the

Lexapro and he is started with 5 mg in the morning and gradually increased.  The

patient is able to tolerate it.





DD: 02/05/2018 18:38

DT: 02/06/2018 01:57

JOB# [id number]  [id number]

## 2018-02-06 NOTE — PROGRESS NOTES
DATE:  02/06/2018



SUBJECTIVE:  Staff was spoken to.  The patient is interviewed.  Mood is noted to

be anxious.  Affect is appropriate.  The patient's coping skills are noted to be

improving.  Sleep and appetite also noted to be improving at this time.  The

patient is stating that he had a good night sleep last night after the dose of

the Seroquel was increased to 50 mg at bedtime.  The patient is reporting that

his family has been trying to help him and they are trying to get all his

belongings into a story and he is going to put the house on sale.



ASSESSMENT:  The patient's depression is resolving.  If the patient continues to

be doing this.  Possibly patient is going to be discharged tomorrow.





DD: 02/06/2018 08:29

DT: 02/06/2018 13:21

JOB# [id number]  [id number]

## 2018-02-06 NOTE — CONSULTATION
DATE OF CONSULTATION:     02/03/2018



REQUESTING PHYSICIAN:  Eva Del Rio M.D.



TYPE OF CONSULTATION:  Psychology.



HISTORY OF PRESENT ILLNESS:  The patient is a 77-year-old  male who is

being admitted on a voluntary basis due to what appears to be acute psychosis. 

According to record review and the patient's self report, the patient has been

acting erratic as well as agitated.  According to his family, the patient has

been throwing items around the house.  Upon interview, the patient is irritable

and stated that he does not understand why he is being hospitalized.  The

patient denied any suicidal ideation, plan, or intention.  The patient is

guarded and somewhat suspicious.  He stated he doesn't belong here. He states 
he is 

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



PAST MEDICAL HISTORY:  Please see history and physical by Dr. Lee.



PAST PSYCHIATRIC HISTORY:  Details are unknown by the patient as well as by

record review.



SUBSTANCE ABUSE HISTORY:  None.  The patient denied any.



HOME MEDICATIONS:  Please see medication reconciliation sheet.



PSYCHOSOCIAL HISTORY:  The patient apparently lives with his current wife, 
however, he is probably going to divorce her and has asked her to leave his 
house.  The patient is a retired academic and  with 6 years 
college education for himself.  He stated his first wife passed away 12 years 
ago and he remarried about 2 years ago. He states his currrent wife has a 
psychiatric history and he can no longer live with her due to the stress in the 
relationship. He states he has 3 children.  His daughter may be visiting today 
and he expects his son and daughter-in-law to arrive from [place] in a few 
days.

He stated he most likely will discharge to his daughters house who lives near 
his home and that he will sell his current home. He states his oldest son is an 
 and will help him with the ongoing arrangements. The patient denied 
any abuse history or any legal problems.  The patient did state that he is 
Cheondoism, but was not specific.



MENTAL STATUS EXAMINATION:  The patient appears to be his stated age.  The

patient's attitude superficially cooperative.  Eye contact is poor.  Mood is

irritable.  Affect is constricted.  Thought process shows to be linear and 
logical with no apparent cognitive deficits.  The patient seems to be having 
difficulty coping with the stress in his life.  The patient denied any suicidal 
ideation,

plan, or intention.  However, record review indicates he has verbalized 
suicidal thoughts with a plan. 

The patient denied any auditory or visual hallucinations.  It appears that the 
patient has some paranoid ideation and is verbalizing suspiciousness and some 
elements of conspiracy to have him hospitalized. 

Concentration is good; he answered correctly to serial 3 subtractions. He 
denies bereavement issues.

The patient was able to answer yes or no questions to the clinical interview 
items.  The patient is stating that he does not belong here and needs to be 
discharged.  The patient is alert and oriented

to self, person, and place, date and time.  The patient was able to

repeat 3 items given to him the first time.  Immediate memory is intact.  Short

term memory and Long-term memory are intact. He was able to interpret proverbs. 

Insight is fair to poor.  Judgment is compromised.



DIAGNOSTIC IMPRESSION:

AXIS I:  Psychotic disorder, not otherwise specified. Anxiety disorder, NOS.

AXIS II:  None.

AXIS III:  Per Dr. Lee.  Please see H and P dictation.



PLAN:  The patient has been evaluated by Dr. Del Rio for psychiatric

evaluation and further management of the patient's psychotropic medications. 

The patient is being closely monitored and will be discharged home or to an

outpatient facility once stabilized.   We will provide a simple anxiety 
reduction skill and rehearse this for acquisition of the skill. The patient was 
able to verbally agree to no self harm and contract for safety by verbalizing 
any suicidal ideation to his Doctors and his family. We will provide coping 
strategies for phase of life issues.  We will provide de-escalation as well as 
stress management to increase the patient's frustration tolerance and reduce 
the patient's agitation and irritability.  The patient is denying any 
depression at this time but admits to anxiety and increased frustration with 
his circumstances. We will recommend to the family that he follow with both 
psychiatry and psychology for medication management and continued supportive 
therapy to assist with the overwhelming stressors he is experiencing. 



Thank you, Dr. Del Rio for this consult and the opportunity to participate

with you in this patient's care.





DD: 02/05/2018 10:02

DT: 02/06/2018 02:01

JOB# [id number]  [id number]

IMAN

## 2018-02-06 NOTE — INTERNAL MEDICINE PROG NOTE
Internal Medicine Subjective





- Subjective


Service Date: 02/06/18


Patient is:: awake


Per staff patient has:: no adverse event, tolerating meds





Internal Medicine Objective





- Results


Result Diagrams: 


 02/01/18 14:27





 02/01/18 14:27


Recent Labs: 


 Laboratory Last Values











WBC  5.8 Th/cmm (4.8-10.8)   02/01/18  14:27    


 


RBC  4.94 Mil/cmm (3.80-5.80)   02/01/18  14:27    


 


Hgb  15.9 gm/dL (12-16)   02/01/18  14:27    


 


Hct  46.8 % (41.0-60)   02/01/18  14:27    


 


MCV  94.7 fl (80-99)   02/01/18  14:27    


 


MCH  32.1 pg (27.0-31.0)  H  02/01/18  14:27    


 


MCHC Differential  33.9 pg (28.0-36.0)   02/01/18  14:27    


 


RDW  13.3 % (11.5-20.0)   02/01/18  14:27    


 


Plt Count  194 Th/cmm (150-400)   02/01/18  14:27    


 


MPV  8.2 fl  02/01/18  14:27    


 


Neutrophils %  62.6 % (40.0-80.0)   02/01/18  14:27    


 


Lymphocytes %  21.6 % (20.0-50.0)   02/01/18  14:27    


 


Monocytes %  12.4 % (2.0-10.0)  H  02/01/18  14:27    


 


Eosinophils %  3.3 % (0.0-5.0)   02/01/18  14:27    


 


Basophils %  0.1 % (0.0-2.0)   02/01/18  14:27    


 


Sodium  138 mEq/L (136-145)   02/01/18  14:27    


 


Potassium  3.9 mEq/L (3.5-5.1)   02/01/18  14:27    


 


Chloride  103 mEq/L ()   02/01/18  14:27    


 


Carbon Dioxide  28.6 mEq/L (21.0-31.0)   02/01/18  14:27    


 


Anion Gap  10.3  (7.0-16.0)   02/01/18  14:27    


 


BUN  16 mg/dL (7-25)   02/01/18  14:27    


 


Creatinine  1.3 mg/dL (0.7-1.3)   02/01/18  14:27    


 


Est GFR ( Amer)  TNP   02/01/18  14:27    


 


Est GFR (Non-Af Amer)  TNP   02/01/18  14:27    


 


BUN/Creatinine Ratio  12.3   02/01/18  14:27    


 


Glucose  125 mg/dL ()  H  02/01/18  14:27    


 


Calcium  8.9 mg/dL (8.6-10.3)   02/01/18  14:27    


 


Total Bilirubin  0.7 mg/dL (0.3-1.0)   02/01/18  14:27    


 


AST  19 U/L (13-39)   02/01/18  14:27    


 


ALT  25 U/L (7-52)   02/01/18  14:27    


 


Alkaline Phosphatase  78 U/L ()   02/01/18  14:27    


 


Total Protein  5.9 gm/dL (6.0-8.3)  L  02/01/18  14:27    


 


Albumin  3.8 gm/dL (4.2-5.5)  L  02/01/18  14:27    


 


Globulin  2.1 gm/dL  02/01/18  14:27    


 


Albumin/Globulin Ratio  1.8  (1.0-1.8)   02/01/18  14:27    


 


Triglycerides  189 mg/dL (<150)  H  02/01/18  14:27    


 


Cholesterol  109 mg/dL (<200)   02/01/18  14:27    


 


LDL Cholesterol Direct  49 mg/dL ()  L  02/01/18  14:27    


 


HDL Cholesterol  45 mg/dL (23-92)   02/01/18  14:27    


 


TSH  2.55 uIU/ml (0.34-5.60)   02/01/18  14:27    


 


Urine Source  CLEAN C   02/01/18  14:27    


 


Urine Color  YELLOW   02/01/18  14:27    


 


Urine Clarity  CLEAR  (CLEAR)   02/01/18  14:27    


 


Urine pH  5.5  (4.6 - 8.0)   02/01/18  14:27    


 


Ur Specific Gravity  >= 1.030  (1.005-1.030)   02/01/18  14:27    


 


Urine Protein  NEGATIVE mg/dL (NEGATIVE)   02/01/18  14:27    


 


Urine Glucose (UA)  NEGATIVE mg/dL (NEGATIVE)   02/01/18  14:27    


 


Urine Ketones  NEGATIVE mg/dL (NEGATIVE)   02/01/18  14:27    


 


Urine Blood  NEGATIVE  (NEGATIVE)   02/01/18  14:27    


 


Urine Nitrate  NEGATIVE  (NEGATIVE)   02/01/18  14:27    


 


Urine Bilirubin  NEGATIVE  (NEGATIVE)   02/01/18  14:27    


 


Urine Urobilinogen  0.2 E.U./dL (0.2 - 1.0)   02/01/18  14:27    


 


Ur Leukocyte Esterase  NEGATIVE  (NEGATIVE)   02/01/18  14:27    


 


Urine RBC  NONE SEEN /hpf (0-5)   02/01/18  14:27    


 


Urine WBC  NONE SEEN /hpf (0-5)   02/01/18  14:27    


 


Ur Epithelial Cells  NONE SEEN /lpf (FEW)   02/01/18  14:27    


 


Urine Bacteria  NONE SEEN /hpf (NONE SEEN)   02/01/18  14:27    


 


RPR  NONREACTIVE  (NONREACTIVE)   02/01/18  14:27    














- Physical Exam


Vitals and I&O: 


 Vital Signs











Temp  98.1 F   02/06/18 06:47


 


Pulse  79   02/06/18 09:42


 


Resp  20   02/06/18 06:47


 


BP  130/66   02/06/18 09:42


 


Pulse Ox  98   02/06/18 06:47








 Intake & Output











 02/05/18 02/06/18 02/06/18





 18:59 06:59 18:59


 


Intake Total  420 


 


Balance  420 


 


Intake:   


 


  Oral  420 


 


Other:   


 


  # Voids  1 











Active Medications: 


Current Medications





Acetaminophen (Tylenol)  650 mg PO Q4HR PRN


   PRN Reason: Mild Pain / Temp above 100


   Stop: 04/02/18 18:44


Al Hydrox/Mg Hydrox/Simethicone (Maalox)  30 ml PO Q4HR PRN


   PRN Reason: GI DISTRESS


   Stop: 04/02/18 18:44


Aspirin (Aspirin Chewable)  81 mg PO [facility]ILY [facility]


   Stop: 04/04/18 08:59


   Last Admin: 02/06/18 09:42 Dose:  81 mg


Carvedilol (Coreg)  6.25 mg PO BID [facility]


   Stop: 04/03/18 16:59


   Last Admin: 02/06/18 09:42 Dose:  6.25 mg


Clopidogrel Bisulfate (Plavix)  75 mg PO DAILY [facility]


   Stop: 04/04/18 08:59


   Last Admin: 02/06/18 09:42 Dose:  75 mg


Escitalopram Oxalate (Lexapro)  5 mg PO DAILY [facility]


   PRN Reason: Protocol


   Stop: 04/07/18 08:59


   Last Admin: 02/06/18 09:43 Dose:  5 mg


Finasteride (Proscar)  5 mg PO DAILY [facility]


   PRN Reason: Protocol


   Stop: 04/04/18 08:59


   Last Admin: 02/06/18 09:43 Dose:  5 mg


Lorazepam (Ativan)  0.5 mg PO Q4HR PRN; Protocol


   PRN Reason: Anxiety


   Stop: 03/03/18 18:44


   Last Admin: 02/05/18 21:53 Dose:  0.5 mg


Magnesium Hydroxide (Milk Of Magnesia)  30 ml PO HS PRN


   PRN Reason: Constipation


Multivitamins/Vitamin C (Theragran)  1 tab PO DAILY [facility]


   Stop: 04/03/18 08:59


   Last Admin: 02/06/18 09:43 Dose:  1 tab


Naproxen (Naprosyn)  250 mg PO Q12H PRN


   PRN Reason: Pain (Mild)


   Stop: 04/03/18 13:19


Quetiapine Fumarate (Seroquel)  50 mg PO HS [facility]


   PRN Reason: Protocol


   Stop: 04/06/18 20:59


   Last Admin: 02/05/18 21:00 Dose:  50 mg


Simvastatin (Zocor)  10 mg PO QPM [facility]


   PRN Reason: Protocol


   Stop: 04/03/18 16:59


   Last Admin: 02/05/18 17:53 Dose:  10 mg


Tamsulosin HCl (Flomax)  0.4 mg PO DAILY [facility]


   Stop: 04/04/18 08:59


   Last Admin: 02/06/18 09:43 Dose:  0.4 mg


Temazepam (Restoril)  15 mg PO HS PRN; Protocol


   PRN Reason: Insomnia


   Stop: 04/03/18 13:19


   Last Admin: 02/05/18 21:52 Dose:  15 mg


Vitamin E (Vitamin E)  1,000 iu PO DAILY [facility]


   Stop: 04/04/18 08:59


   Last Admin: 02/06/18 09:43 Dose:  1,000 iu








General: alert


HEENT: NC/AT, PERRLA


Neck: Supple


Lungs: CTAB


Cardiovascular: RRR, Normal S1, Normal S2


Abdomen: soft, non-tender, non-distended


Neurological: alert





Internal Medicine Assmt/Plan





- Assessment


Assessment: 





suicidal ideation


hx cad


hx mi


bph





- Plan


Plan: 





fall precautions


continue current plan of care

## 2018-02-07 RX ADMIN — ASPIRIN 81 MG SCH MG: 81 TABLET ORAL at 08:46

## 2018-02-07 RX ADMIN — Medication SCH IU: at 08:45

## 2018-02-07 RX ADMIN — ESCITALOPRAM SCH MG: 5 TABLET, FILM COATED ORAL at 08:47

## 2018-02-07 NOTE — PROGRESS NOTES
DATE:  02/07/2018



PSYCHIATRIC PROGRESS NOTE



SUBJECTIVE:  Staff was spoken to.  The patient is interviewed.  Mood is noted to

be anxious.  Affect is appropriate.  The patient is happy that the children have

been helping him out and he states he is feeling much better since the time he

came in.  No side effects to the medications are noted.  The patient has been

able to verbalize the concerns rather than to act out.



ASSESSMENT:  The patient is stabilizing.



PLAN:  To discharge the patient today for followup on outpatient basis.





DD: 02/07/2018 08:42

DT: 02/07/2018 13:07

JOB# [id number]  [id number]

## 2018-02-08 NOTE — PROGRESS NOTES
DATE:     02/06/2018



SUBJECTIVE:  The patient was seen and case discussed with staff.  The patient

appears to be improving and stated that his depression and anxiety seemed to be

lessening.  The discussion included possible discharge recommendations with

respect to continued care.  The patient's family, i.e., the patient's son and

daughter-in-law were also present and requested a family meeting and session. 

This was provided.  All aspects of the patient's care were discussed as well as

the recommendations and protocol for continued care post discharge.  It was

impressed upon the family to have a both Psychiatry and Psychology consult set

up.  The patient and family stated that the patient will be going to live at his

daughter's home and that the patient's home would be put up for sale.  They also

discussed that his current wife has been asked to leave the home and that any

legal matters will be discussed with the family and their .



The patient stated that the marital discord has been going on for a long period

of time and that he agreed to follow up with a psychologist or a family

therapist to assist him in the separation and possible divorce proceedings.  The

patient was able to verbally contract for safety and no self-harm.



OBJECTIVE: Mood: positive for mild anxiety. Patient denied depression. Affect: 
mood congruent. 

Thought shows to be linear and logical with a normal pattern of association. 
Patient denied any delusions, hallucinations or suicidal ideation. Behavior is 
compliant with care and treatment. 



ASSESSMENT:

1.  Psychotic disorder, not otherwise specified, resolving.

2.  Major depressive disorder, single episode, resolving.

3.  Anxiety disorder, not otherwise specified, resolving.



PLAN:  As discussed above, the patient and the patient's family are very engaged

in the total care for the patient as discussed in this session.  Dr. Del Rio

indicates that if the patient shows continued improvement that a possible

discharge would be on 02/07/2018.  This writer reviewed the protocol for any 
person who has

verbalized suicidal ideation with respect to the importance and medical 
necessity of continued care

with both Psychology and Psychiatry as soon as possible post-discharge.  We

reviewed coping strategies for the patient as well as phase of life issues and

to reduce marital discord.  The patient's son is an  and will advise him

with respect to legal issues.



Thank you, Dr. Del Rio for the opportunity to participate with you in this

patient's care.





DD: 02/07/2018 10:14

DT: 02/08/2018 00:02

[facility]# [id number]  [id number]

IMAN

## 2018-02-10 NOTE — DISCHARGE SUMMARY
DATE OF DISCHARGE:  02/07/2018



IDENTIFYING DATA:  The patient is a 77-year-old  male living by

himself.  Information obtained by directly interviewing the patient as well as

reviewing the admission papers.



JUSTIFICATION OF HOSPITALIZATION:  The patient is admitted on a voluntary basis

in view of his acute agitation.



CHIEF COMPLAINT:  "I don't know what is happening here.  There is not much for

me to do in here."



DIAGNOSES AT THE TIME OF ADMISSION:

AXIS:  Psychotic disorder, not otherwise specified.

AXIS II:  None.

AXIS III:  As per Dr. Lee.



HISTORY OF PRESENT ILLNESS:  Please refer to 02/02/2018 dictation done by me. 

Physical examination at the time of admission was done by Dr. Lee and is

noted to be significant for coronary artery disease status post MI and BPH.



HOSPITAL COURSE AND RESPONSE TO TREATMENT:  Blood work done at the

hospitalization have been reviewed by Dr. Lee.



HOSPITAL COURSE AND RESPONSE TO TREATMENT:  The patient has been closely

monitored on the inpatient unit, provided with supportive psychotherapy.  The

patient has been voicing suicidal ideation and hence the patient has been

started with the escitalopram, which was given 5 mg in the morning.  The patient

has been complaining of insomnia and agitation and the patient has been placed

on Seroquel, which was gradually increased to 50 mg at bedtime.  With these

medications, the patient has been observed and was noted to be doing fairly

well.  The patient had the family session and patient's family has been very

supportive.  They wanted to take care of the patient and hence the patient was

discharged on 02/07/2018 with recommendation that he is going to be seeking

treatment on an outpatient basis.



MENTAL STATUS EXAMINATION:  At the time of discharge, the patient's mood noted

to be anxious.  Affect is appropriate.  Not suicidal or homicidal.  The patient

denies any auditory hallucinations or delusions are noted.  Insight and judgment

are fair.  Impulse control is also noted to be fair.  The patient denies any

auditory hallucinations or delusions are noted.



DIAGNOSES AT THE TIME OF DISCHARGE:

AXIS I:  Major depressive disorder, first episode with psychotic symptoms.

AXIS II:  None.

AXIS III:  As per Dr. Lee.



AFTERCARE PLAN:  The patient is discharged to [facility] to be followed up on an

outpatient basis.



PROGNOSIS:  At the time of discharge noted to be fair with the treatment.





DD: 02/10/2018 16:15

DT: 02/10/2018 17:15

JOB# [id number]  [id number]